# Patient Record
Sex: FEMALE | Race: WHITE | Employment: OTHER | URBAN - METROPOLITAN AREA
[De-identification: names, ages, dates, MRNs, and addresses within clinical notes are randomized per-mention and may not be internally consistent; named-entity substitution may affect disease eponyms.]

---

## 2019-03-18 ENCOUNTER — OFFICE VISIT (OUTPATIENT)
Dept: ORTHOPEDIC SURGERY | Age: 69
End: 2019-03-18
Payer: MEDICARE

## 2019-03-18 VITALS — BODY MASS INDEX: 47.09 KG/M2 | WEIGHT: 293 LBS | HEIGHT: 66 IN

## 2019-03-18 DIAGNOSIS — M17.11 PRIMARY OSTEOARTHRITIS OF RIGHT KNEE: Primary | ICD-10-CM

## 2019-03-18 PROCEDURE — 99203 OFFICE O/P NEW LOW 30 MIN: CPT | Performed by: ORTHOPAEDIC SURGERY

## 2019-03-18 RX ORDER — ROPINIROLE 1 MG/1
TABLET, FILM COATED ORAL
COMMUNITY
Start: 2019-01-31

## 2019-04-08 ENCOUNTER — OFFICE VISIT (OUTPATIENT)
Dept: ORTHOPEDIC SURGERY | Age: 69
End: 2019-04-08
Payer: MEDICARE

## 2019-04-08 VITALS — WEIGHT: 293 LBS | BODY MASS INDEX: 47.09 KG/M2 | HEIGHT: 66 IN

## 2019-04-08 DIAGNOSIS — M17.11 PRIMARY OSTEOARTHRITIS OF RIGHT KNEE: Primary | ICD-10-CM

## 2019-04-08 PROCEDURE — 99212 OFFICE O/P EST SF 10 MIN: CPT | Performed by: ORTHOPAEDIC SURGERY

## 2019-04-08 NOTE — PROGRESS NOTES
Chief Complaint:   Chief Complaint   Patient presents with    Follow-up     follow up Right knee pain. Patient is doing PT 2 x a week. She states that PT is helping. Maribel Kennedy  is doing well with her physical therapy. She's made great gains and is actively exercising at home as well. She says she feels better overall. She is not sure if the therapist want to continue her treatment she is not discussed this specifically with them. Allergies; medications; past medical, surgical, family, and social history; and problem list have been reviewed today and updated as indicated in this encounter seen below. Exam: Chair moves around much more freely than she did on the previous visit. She appears to have much less discomfort. Her knee range of motion is somewhat better and she has less discomfort. Crepitus remains. Overall she is improved. ASSESSMENT:    Monica Rothman was seen today for follow-up. Diagnoses and all orders for this visit:    Primary osteoarthritis of right knee        PLAN: Establish a closing plan with physical therapist to finish her up and instruct her in home exercises which she will continue. We'll follow on an as-needed basis considering her excellent     Return if symptoms worsen or fail to improve. Current Outpatient Medications   Medication Sig Dispense Refill    rOPINIRole (REQUIP) 1 MG tablet       ibuprofen (ADVIL;MOTRIN) 800 MG tablet Take 1 tablet by mouth every 8 hours as needed for Pain for 7 days. 20 tablet 1    amitriptyline (ELAVIL) 75 MG tablet Take 75 mg by mouth nightly.  vitamin D (ERGOCALCIFEROL) 26084 UNITS CAPS capsule Take 50,000 Units by mouth once a week.  hydroxychloroquine (PLAQUENIL) 200 MG tablet Take 200 mg by mouth 2 times daily.  propranolol (INDERAL) 20 MG tablet Take 20 mg by mouth 3 times daily.  pilocarpine (SALAGEN) 5 MG tablet Take 5 mg by mouth 4 times daily.       simvastatin (ZOCOR) 40 MG tablet Take 40 mg by mouth nightly.  omeprazole (PRILOSEC) 20 MG capsule Take 20 mg by mouth daily.  gabapentin (NEURONTIN) 100 MG capsule Take 200 mg by mouth 3 times daily.  levothyroxine (SYNTHROID) 100 MCG tablet Take 100 mcg by mouth daily.  fish oil-omega-3 fatty acids 1000 MG capsule Take 2 g by mouth daily.  therapeutic multivitamin-minerals (THERAGRAN-M) tablet Take 1 tablet by mouth daily.  clonazePAM (KLONOPIN) 0.5 MG tablet Take 0.5 mg by mouth nightly. No current facility-administered medications for this visit. Patient Active Problem List   Diagnosis    Chest pain    HLD (hyperlipidemia)    Hypothyroid    Lupus    Fibromyalgia       Past Medical History:   Diagnosis Date    Colitis     Fibromyalgia     Lupus     Thyroid disease        Past Surgical History:   Procedure Laterality Date    COLONOSCOPY      2017    FINGER SURGERY      KNEE ARTHROSCOPY Right     LAPAROSCOPY  June 2014    Bilateral oophorectomy    THYROID SURGERY      partial       Allergies   Allergen Reactions    Coconut Flavor [Flavoring Agent] Hives       Social History     Socioeconomic History    Marital status:      Spouse name: None    Number of children: None    Years of education: None    Highest education level: None   Occupational History    None   Social Needs    Financial resource strain: None    Food insecurity:     Worry: None     Inability: None    Transportation needs:     Medical: None     Non-medical: None   Tobacco Use    Smoking status: Former Smoker     Packs/day: 0.25     Years: 35.00     Pack years: 8.75     Last attempt to quit: 2016     Years since quitting: 3.2    Smokeless tobacco: Never Used   Substance and Sexual Activity    Alcohol use:  Yes     Alcohol/week: 0.0 oz     Comment: rare    Drug use: No    Sexual activity: None   Lifestyle    Physical activity:     Days per week: None     Minutes per session: None    Stress: None   Relationships  Social connections:     Talks on phone: None     Gets together: None     Attends Church service: None     Active member of club or organization: None     Attends meetings of clubs or organizations: None     Relationship status: None    Intimate partner violence:     Fear of current or ex partner: None     Emotionally abused: None     Physically abused: None     Forced sexual activity: None   Other Topics Concern    None   Social History Narrative    None       Review of Systems  As follows except as previously noted in HPI:  Constitutional: Negative for chills, diaphoresis, fatigue, fever and unexpected weight change. Respiratory: Negative for cough, shortness of breath and wheezing. Cardiovascular: Negative for chest pain and palpitations. Neurological: Negative for dizziness, syncope, cephalgia. GI / : negative  Musculoskeletal: see HPI       Objective:   Physical Exam   Constitutional: Oriented to person, place, and time. and appears well-developed and well-nourished. :   Head: Normocephalic and atraumatic. Eyes: EOM are normal.   Neck: Neck supple. Cardiovascular: Normal rate and regular rhythm. Pulmonary/Chest: Effort normal. No stridor. No respiratory distress, no wheezes. Abdominal:  No abnormal distension. Neurological: Alert and oriented to person, place, and time. Skin: Skin is warm and dry. Psychiatric: Normal mood and affect.  Behavior is normal. Thought content normal.    TALHA Rodriguez DO    4/8/19  2:14 PM

## 2019-05-31 ENCOUNTER — HOSPITAL ENCOUNTER (OUTPATIENT)
Dept: ULTRASOUND IMAGING | Age: 69
Discharge: HOME | End: 2019-05-31
Payer: MEDICARE

## 2019-05-31 DIAGNOSIS — C54.1 ENDOMETRIAL SARCOMA (HCC): ICD-10-CM

## 2019-05-31 PROCEDURE — 76830 TRANSVAGINAL US NON-OB: CPT

## 2019-12-09 ENCOUNTER — HOSPITAL ENCOUNTER (OUTPATIENT)
Dept: MAMMOGRAPHY | Age: 69
Discharge: HOME | End: 2019-12-11
Payer: MEDICARE

## 2019-12-09 DIAGNOSIS — Z78.0 POSTMENOPAUSAL STATUS: ICD-10-CM

## 2019-12-09 PROCEDURE — 77080 DXA BONE DENSITY AXIAL: CPT

## 2021-06-04 DIAGNOSIS — M25.561 RIGHT KNEE PAIN, UNSPECIFIED CHRONICITY: Primary | ICD-10-CM

## 2021-06-07 ENCOUNTER — OFFICE VISIT (OUTPATIENT)
Dept: ORTHOPEDIC SURGERY | Age: 71
End: 2021-06-07
Payer: MEDICARE

## 2021-06-07 VITALS — WEIGHT: 293 LBS | TEMPERATURE: 98 F | HEIGHT: 66 IN | BODY MASS INDEX: 47.09 KG/M2

## 2021-06-07 DIAGNOSIS — M17.11 PRIMARY OSTEOARTHRITIS OF RIGHT KNEE: Primary | ICD-10-CM

## 2021-06-07 PROCEDURE — 1036F TOBACCO NON-USER: CPT | Performed by: ORTHOPAEDIC SURGERY

## 2021-06-07 PROCEDURE — 99203 OFFICE O/P NEW LOW 30 MIN: CPT | Performed by: ORTHOPAEDIC SURGERY

## 2021-06-07 PROCEDURE — 1123F ACP DISCUSS/DSCN MKR DOCD: CPT | Performed by: ORTHOPAEDIC SURGERY

## 2021-06-07 PROCEDURE — G8427 DOCREV CUR MEDS BY ELIG CLIN: HCPCS | Performed by: ORTHOPAEDIC SURGERY

## 2021-06-07 PROCEDURE — 4040F PNEUMOC VAC/ADMIN/RCVD: CPT | Performed by: ORTHOPAEDIC SURGERY

## 2021-06-07 PROCEDURE — G8417 CALC BMI ABV UP PARAM F/U: HCPCS | Performed by: ORTHOPAEDIC SURGERY

## 2021-06-07 PROCEDURE — 3017F COLORECTAL CA SCREEN DOC REV: CPT | Performed by: ORTHOPAEDIC SURGERY

## 2021-06-07 PROCEDURE — 1090F PRES/ABSN URINE INCON ASSESS: CPT | Performed by: ORTHOPAEDIC SURGERY

## 2021-06-07 PROCEDURE — G8399 PT W/DXA RESULTS DOCUMENT: HCPCS | Performed by: ORTHOPAEDIC SURGERY

## 2021-06-07 RX ORDER — LEVOTHYROXINE SODIUM 112 MCG
TABLET ORAL
COMMUNITY
Start: 2021-03-15

## 2021-06-07 NOTE — PROGRESS NOTES
reviewed including oral medications, supports, physical therapy, topical agents, injection and ultimately surgery which she was not interested in. PLAN: Summer Delaney will try conservative means with oral medications and topical preps to see if this helps her discomfort. She is encouraged to let us know how things are progressing and schedule follow-up at her discretion. Patient Active Problem List   Diagnosis    Chest pain    HLD (hyperlipidemia)    Hypothyroid    Lupus ([facility] [address]    Fibromyalgia       Past Medical History:   Diagnosis Date    Colitis     Fibromyalgia     Lupus ([facility] [address]     Thyroid disease        Past Surgical History:   Procedure Laterality Date    COLONOSCOPY      2017    FINGER SURGERY      KNEE ARTHROSCOPY Right     LAPAROSCOPY  June 2014    Bilateral oophorectomy    THYROID SURGERY      partial       Current Outpatient Medications   Medication Sig Dispense Refill    SYNTHROID 112 MCG tablet       rOPINIRole (REQUIP) 1 MG tablet       amitriptyline (ELAVIL) 75 MG tablet Take 75 mg by mouth nightly.  vitamin D (ERGOCALCIFEROL) 22139 UNITS CAPS capsule Take 50,000 Units by mouth once a week.  hydroxychloroquine (PLAQUENIL) 200 MG tablet Take 200 mg by mouth 2 times daily.  propranolol (INDERAL) 20 MG tablet Take 20 mg by mouth 3 times daily.  pilocarpine (SALAGEN) 5 MG tablet Take 5 mg by mouth 4 times daily.  simvastatin (ZOCOR) 40 MG tablet Take 40 mg by mouth nightly.  omeprazole (PRILOSEC) 20 MG capsule Take 20 mg by mouth daily.  gabapentin (NEURONTIN) 100 MG capsule Take 200 mg by mouth 3 times daily.  levothyroxine (SYNTHROID) 100 MCG tablet Take 100 mcg by mouth daily.  fish oil-omega-3 fatty acids 1000 MG capsule Take 2 g by mouth daily.  therapeutic multivitamin-minerals (THERAGRAN-M) tablet Take 1 tablet by mouth daily.  clonazePAM (KLONOPIN) 0.5 MG tablet Take 0.5 mg by mouth nightly.         ibuprofen (ADVIL;MOTRIN) 800 MG tablet Take 1 tablet by mouth every 8 hours as needed for Pain for 7 days. 20 tablet 1     No current facility-administered medications for this visit. Allergies   Allergen Reactions    Flavoring Agent Hives     Coconut       Social History     Socioeconomic History    Marital status:      Spouse name: None    Number of children: None    Years of education: None    Highest education level: None   Occupational History    None   Tobacco Use    Smoking status: Former Smoker     Packs/day: 0.25     Years: 35.00     Pack years: 8.75     Quit date:      Years since quittin.4    Smokeless tobacco: Never Used   Vaping Use    Vaping Use: Never used   Substance and Sexual Activity    Alcohol use: Yes     Alcohol/week: 0.0 standard drinks     Comment: rare    Drug use: No    Sexual activity: None   Other Topics Concern    None   Social History Narrative    None     Social Determinants of Health     Financial Resource Strain:     Difficulty of Paying Living Expenses:    Food Insecurity:     Worried About Running Out of Food in the Last Year:     Ran Out of Food in the Last Year:    Transportation Needs:     Lack of Transportation (Medical):  Lack of Transportation (Non-Medical):    Physical Activity:     Days of Exercise per Week:     Minutes of Exercise per Session:    Stress:     Feeling of Stress :    Social Connections:     Frequency of Communication with Friends and Family:     Frequency of Social Gatherings with Friends and Family:     Attends Orthodoxy Services:     Active Member of Clubs or Organizations:     Attends Club or Organization Meetings:     Marital Status:    Intimate Partner Violence:     Fear of Current or Ex-Partner:     Emotionally Abused:     Physically Abused:     Sexually Abused:        History reviewed. No pertinent family history.       Review of Systems:   As follows except as previously noted in HPI:  Constitutional:

## 2022-02-01 ENCOUNTER — HOSPITAL ENCOUNTER (OUTPATIENT)
Dept: MAMMOGRAPHY | Age: 72
Discharge: HOME | End: 2022-02-03
Payer: MEDICARE

## 2022-02-01 DIAGNOSIS — N95.1 POST MENOPAUSAL SYNDROME: ICD-10-CM

## 2022-02-01 PROCEDURE — 77080 DXA BONE DENSITY AXIAL: CPT

## 2024-02-22 ENCOUNTER — TRANSCRIBE ORDERS (OUTPATIENT)
Dept: ADMINISTRATIVE | Age: 74
End: 2024-02-22

## 2024-02-22 DIAGNOSIS — N95.9 POST MENOPAUSAL PROBLEMS: Primary | ICD-10-CM
